# Patient Record
Sex: MALE | URBAN - METROPOLITAN AREA
[De-identification: names, ages, dates, MRNs, and addresses within clinical notes are randomized per-mention and may not be internally consistent; named-entity substitution may affect disease eponyms.]

---

## 2020-06-05 ENCOUNTER — OFFICE VISIT (OUTPATIENT)
Dept: URBAN - METROPOLITAN AREA CLINIC 88 | Facility: CLINIC | Age: 30
End: 2020-06-05
Payer: COMMERCIAL

## 2020-06-05 DIAGNOSIS — H04.123 DRY EYE SYNDROME OF BILATERAL LACRIMAL GLANDS: ICD-10-CM

## 2020-06-05 DIAGNOSIS — H52.221 REGULAR ASTIGMATISM, RIGHT EYE: Primary | ICD-10-CM

## 2020-06-05 PROCEDURE — 92004 COMPRE OPH EXAM NEW PT 1/>: CPT | Performed by: OPTOMETRIST

## 2020-06-05 ASSESSMENT — INTRAOCULAR PRESSURE
OS: 11
OD: 11

## 2020-06-05 ASSESSMENT — VISUAL ACUITY
OD: 20/20
OS: 20/20

## 2020-06-05 ASSESSMENT — KERATOMETRY
OD: 42.13
OS: 42.88

## 2020-06-05 NOTE — IMPRESSION/PLAN
Impression: Regular astigmatism, right eye: H52.221. Plan: Reviewed refractive prescription in detail with patient and no need for glasses to improve vision at this time.

## 2020-06-05 NOTE — IMPRESSION/PLAN
Impression: Dry eye syndrome of bilateral lacrimal glands: H04.123. Plan: Discussed condition with patient in detail. Mild asymmetry in palpebral fissure size OD>OS by 1mm. Recommend treatment with Preservative Free Artificial tears QID (Systane or Refresh Brand recommended). RTC if symptoms continue.